# Patient Record
Sex: MALE | Race: WHITE | NOT HISPANIC OR LATINO | URBAN - METROPOLITAN AREA
[De-identification: names, ages, dates, MRNs, and addresses within clinical notes are randomized per-mention and may not be internally consistent; named-entity substitution may affect disease eponyms.]

---

## 2024-06-17 ENCOUNTER — APPOINTMENT (OUTPATIENT)
Dept: UROLOGY | Facility: CLINIC | Age: 69
End: 2024-06-17

## 2024-06-17 PROBLEM — N40.1 BENIGN PROSTATIC HYPERPLASIA WITH URINARY OBSTRUCTION AND OTHER LOWER URINARY TRACT SYMPTOMS: Status: ACTIVE | Noted: 2024-06-17

## 2024-06-17 PROBLEM — N52.9 ERECTILE DYSFUNCTION: Status: ACTIVE | Noted: 2024-06-17

## 2024-06-17 PROBLEM — N13.8 BENIGN PROSTATIC HYPERPLASIA WITH URINARY OBSTRUCTION AND OTHER LOWER URINARY TRACT SYMPTOMS: Status: ACTIVE | Noted: 2024-06-17

## 2024-06-17 PROBLEM — R97.20 ELEVATED PSA: Status: ACTIVE | Noted: 2024-06-17

## 2024-06-17 PROBLEM — R35.1 NOCTURIA: Status: ACTIVE | Noted: 2024-06-17

## 2024-06-17 ASSESSMENT — ENCOUNTER SYMPTOMS
ALLERGIC/IMMUNOLOGIC NEGATIVE: 1
EYES NEGATIVE: 1
NAUSEA: 0
COUGH: 0
CHILLS: 0
DIFFICULTY URINATING: 0
FEVER: 0
ENDOCRINE NEGATIVE: 1
PSYCHIATRIC NEGATIVE: 1
SHORTNESS OF BREATH: 0

## 2024-06-17 NOTE — PROGRESS NOTES
Subjective   Patient ID: Devaughn Jiang is a 68 y.o. male.    HPI  Patient has chronic Hx of Elevated PSA. Most recent PSA was 10.90 on 4/24. Prior PSA was 8.72 on 10/22. Prior PSA was 8.74 on 4/22. ..Prior PSA was 6.03 on 10/21..Prior PSA was 7.64 on 03/21, prior was a 6.32 8/2020.. Prior PSA was 5.92 on 1/2020.... Patient did have a negative bx in 5/18.. 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 Patient does have a Fhx of prostate CA(dad)...Denies weight loss. Denies bone pain..Select results (09/2020)showed very low risk for a lily >7.. BPH sx are chronic and mild. He does have some frequency and urgency. Diuretics make this worse. .He does have some hesitancy. Denies dysuria and hematuria. Nocturia x1-2. No medication for LUT'S. He does take saw palmetto supplement. ED is an issue and is not an issue     Review of Systems   Constitutional:  Negative for chills and fever.   HENT: Negative.     Eyes: Negative.    Respiratory:  Negative for cough and shortness of breath.    Cardiovascular:  Negative for chest pain and leg swelling.   Gastrointestinal:  Negative for nausea.   Endocrine: Negative.    Genitourinary:  Negative for difficulty urinating.        Negative except for documented in HPI   Allergic/Immunologic: Negative.    Neurological:         Alert & oriented X 3   Hematological:         Denies blood thinners   Psychiatric/Behavioral: Negative.         Objective   Physical Exam  Vitals and nursing note reviewed.   Constitutional:       General: He is not in acute distress.     Appearance: Normal appearance.   Pulmonary:      Effort: Pulmonary effort is normal.   Abdominal:      Tenderness: There is no abdominal tenderness.   Genitourinary:     Comments: Kidneys non palpable bilaterally  Bladder non palpable or tender  Scrotum no mass, No hydrocele  Epididymis- No spermatocele. Non Tender.  Testicles: No mass  Urethra: No discharge  Penis within normal limits... No lesions  Prostate - symmetric, no nodules  Seminal  Vesicals: No mass.  Sphincter tone: normal  Mild LE edema  Neurological:      Mental Status: He is alert.         Assessment/Plan   Diagnoses and all orders for this visit:  Benign prostatic hyperplasia with urinary obstruction and other lower urinary tract symptoms  Erectile dysfunction, unspecified erectile dysfunction type  Nocturia  Elevated PSA      All available PSA values reviewed, Options discussed. Questions answered.  Past MRI reviewed  Discussed repeat MRI-Patient will consider and will check with insurance  Pros and cons of prostate biopsy reviewed. Other options discussed. Questions answered  Past Bx reviewed   Diet changes for prostate health discussed and educational information given. Pros/Cons of prostate health supplements discussed.   Treatment options for LUTS reviewed  Discussed timed voiding. Discussed fluid and caffeine intake  Treatment options for ED reviewed-Not an issue  Lifestyle change to help prevent UTIs discussed. Encouraged fluid intake.    F/U  6 months with PSA-Unless patient agrees to do MRI sooner

## 2024-06-20 ENCOUNTER — APPOINTMENT (OUTPATIENT)
Dept: UROLOGY | Facility: CLINIC | Age: 69
End: 2024-06-20
Payer: COMMERCIAL

## 2024-06-20 VITALS — RESPIRATION RATE: 16 BRPM | WEIGHT: 268 LBS | BODY MASS INDEX: 27.35 KG/M2

## 2024-06-20 DIAGNOSIS — N40.1 BENIGN PROSTATIC HYPERPLASIA WITH URINARY OBSTRUCTION AND OTHER LOWER URINARY TRACT SYMPTOMS: ICD-10-CM

## 2024-06-20 DIAGNOSIS — N52.9 ERECTILE DYSFUNCTION, UNSPECIFIED ERECTILE DYSFUNCTION TYPE: ICD-10-CM

## 2024-06-20 DIAGNOSIS — N13.8 BENIGN PROSTATIC HYPERPLASIA WITH URINARY OBSTRUCTION AND OTHER LOWER URINARY TRACT SYMPTOMS: ICD-10-CM

## 2024-06-20 DIAGNOSIS — R97.20 ELEVATED PSA: ICD-10-CM

## 2024-06-20 DIAGNOSIS — R35.1 NOCTURIA: ICD-10-CM

## 2024-06-20 PROCEDURE — 99214 OFFICE O/P EST MOD 30 MIN: CPT | Performed by: UROLOGY

## 2024-06-20 PROCEDURE — 1159F MED LIST DOCD IN RCRD: CPT | Performed by: UROLOGY

## 2024-06-20 PROCEDURE — 1036F TOBACCO NON-USER: CPT | Performed by: UROLOGY

## 2024-06-20 RX ORDER — MELOXICAM 15 MG/1
15 TABLET ORAL DAILY
COMMUNITY

## 2024-06-20 RX ORDER — SPIRONOLACTONE 50 MG/1
25 TABLET, FILM COATED ORAL
COMMUNITY
Start: 2024-04-23 | End: 2025-04-23

## 2024-06-20 RX ORDER — LISINOPRIL AND HYDROCHLOROTHIAZIDE 10; 12.5 MG/1; MG/1
1 TABLET ORAL
COMMUNITY
Start: 2024-06-10 | End: 2025-06-10

## 2024-06-20 RX ORDER — AMLODIPINE BESYLATE 5 MG/1
5 TABLET ORAL
COMMUNITY
Start: 2024-06-10 | End: 2025-06-10

## 2024-12-10 ASSESSMENT — ENCOUNTER SYMPTOMS
SHORTNESS OF BREATH: 0
PSYCHIATRIC NEGATIVE: 1
ALLERGIC/IMMUNOLOGIC NEGATIVE: 1
NAUSEA: 0
DIFFICULTY URINATING: 0
FEVER: 0
CHILLS: 0
ENDOCRINE NEGATIVE: 1
COUGH: 0
EYES NEGATIVE: 1

## 2024-12-10 NOTE — PROGRESS NOTES
Subjective   Patient ID: Devaughn Jiang is a 69 y.o. male.    HPI  Patient has chronic Hx of Elevated PSA. Most recent PSA was 8.33 on 12/24. Prior PSA was 10.90 on 4/24. Prior PSA was 8.72 on 10/22. Prior PSA was 8.74 on 4/22. ..Prior PSA was 6.03 on 10/21..Prior PSA was 7.64 on 03/21, prior was a 6.32 8/2020.. Prior PSA was 5.92 on 1/2020.... Patient did have a negative bx in 5/18.. 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 Patient does have a Fhx of prostate CA(dad)...Denies weight loss. Denies bone pain..Select results (09/2020)showed very low risk for a lily >7.. BPH sx are chronic and mild. He does have some frequency and urgency. Diuretics make this worse. .He does have some hesitancy. Denies dysuria and hematuria. Nocturia x1-2. No medication for LUT'S. He does take saw palmetto supplement. ED is not an issue           Review of Systems   Constitutional:  Negative for chills and fever.   HENT: Negative.     Eyes: Negative.    Respiratory:  Negative for cough and shortness of breath.    Cardiovascular:  Negative for chest pain and leg swelling.   Gastrointestinal:  Negative for nausea.   Endocrine: Negative.    Genitourinary:  Negative for difficulty urinating.        Negative except for documented in HPI   Allergic/Immunologic: Negative.    Neurological:         Alert & oriented X 3   Hematological:         Denies blood thinners   Psychiatric/Behavioral: Negative.         Objective   Physical Exam  Vitals and nursing note reviewed.   Constitutional:       General: He is not in acute distress.     Appearance: Normal appearance.   Pulmonary:      Effort: Pulmonary effort is normal.   Abdominal:      Tenderness: There is no abdominal tenderness.   Genitourinary:     Comments: Kidneys non palpable bilaterally  Bladder non palpable or tender  Scrotum no mass, No hydrocele  Epididymis- No spermatocele. Non Tender.  Testicles: No mass. WNL  Urethra: No discharge  Penis within normal limits... No lesions  Prostate - symmetric,  no nodules. BENIGN  Seminal Vesicals: No mass.  Sphincter tone: normal  Neurological:      Mental Status: He is alert.         Assessment/Plan     Diagnoses and all orders for this visit:  Elevated PSA  Nocturia  Erectile dysfunction, unspecified erectile dysfunction type  Benign prostatic hyperplasia with urinary obstruction and other lower urinary tract symptoms    All available PSA values reviewed, Options discussed. Questions answered.  Past MRI reviewed  Disucssed repeat MRI  Pros and cons of prostate biopsy reviewed. Other options discussed. Questions answered   Diet changes for prostate health discussed and educational information given. Pros/Cons of prostate health supplements discussed.   Treatment options for LUTS reviewed-Not Bothersome  Discussed timed voiding. Discussed fluid and caffeine intake  Treatment options for ED reviewed-Observe  Lifestyle change to help prevent UTIs discussed. Encouraged fluid intake.  BMP reviewed  F/U 6 months with PSA

## 2024-12-12 ENCOUNTER — APPOINTMENT (OUTPATIENT)
Dept: UROLOGY | Facility: CLINIC | Age: 69
End: 2024-12-12
Payer: COMMERCIAL

## 2024-12-12 VITALS
BODY MASS INDEX: 27.15 KG/M2 | SYSTOLIC BLOOD PRESSURE: 114 MMHG | DIASTOLIC BLOOD PRESSURE: 77 MMHG | HEART RATE: 106 BPM | WEIGHT: 266 LBS

## 2024-12-12 DIAGNOSIS — R35.1 NOCTURIA: ICD-10-CM

## 2024-12-12 DIAGNOSIS — R97.20 ELEVATED PSA: ICD-10-CM

## 2024-12-12 DIAGNOSIS — N40.1 BENIGN PROSTATIC HYPERPLASIA WITH URINARY OBSTRUCTION AND OTHER LOWER URINARY TRACT SYMPTOMS: ICD-10-CM

## 2024-12-12 DIAGNOSIS — N13.8 BENIGN PROSTATIC HYPERPLASIA WITH URINARY OBSTRUCTION AND OTHER LOWER URINARY TRACT SYMPTOMS: ICD-10-CM

## 2024-12-12 DIAGNOSIS — N52.9 ERECTILE DYSFUNCTION, UNSPECIFIED ERECTILE DYSFUNCTION TYPE: ICD-10-CM

## 2024-12-12 PROCEDURE — 99214 OFFICE O/P EST MOD 30 MIN: CPT | Performed by: UROLOGY

## 2024-12-12 PROCEDURE — 1159F MED LIST DOCD IN RCRD: CPT | Performed by: UROLOGY

## 2024-12-12 PROCEDURE — 1036F TOBACCO NON-USER: CPT | Performed by: UROLOGY

## 2025-06-12 ENCOUNTER — APPOINTMENT (OUTPATIENT)
Dept: UROLOGY | Facility: CLINIC | Age: 70
End: 2025-06-12
Payer: COMMERCIAL